# Patient Record
Sex: MALE | Race: WHITE
[De-identification: names, ages, dates, MRNs, and addresses within clinical notes are randomized per-mention and may not be internally consistent; named-entity substitution may affect disease eponyms.]

---

## 2019-02-18 ENCOUNTER — HOSPITAL ENCOUNTER (INPATIENT)
Dept: HOSPITAL 53 - M ED | Age: 39
LOS: 2 days | Discharge: HOME | DRG: 135 | End: 2019-02-20
Attending: SURGERY | Admitting: SURGERY
Payer: SELF-PAY

## 2019-02-18 VITALS — HEIGHT: 66 IN | WEIGHT: 225.75 LBS | BODY MASS INDEX: 36.28 KG/M2
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
## 2019-02-18 DIAGNOSIS — S27.0XXA: Primary | ICD-10-CM

## 2019-02-18 DIAGNOSIS — Z79.899: ICD-10-CM

## 2019-02-18 DIAGNOSIS — F17.290: ICD-10-CM
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
## 2019-02-18 DIAGNOSIS — K21.9: ICD-10-CM

## 2019-02-18 DIAGNOSIS — S22.41XA: ICD-10-CM
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
## 2019-02-18 LAB
ALBUMIN SERPL BCG-MCNC: 4.1 GM/DL (ref 3.2–5.2)
ALT SERPL W P-5'-P-CCNC: 50 U/L (ref 12–78)
BILIRUB CONJ SERPL-MCNC: 0.2 MG/DL (ref 0–0.2)
BILIRUB SERPL-MCNC: 0.9 MG/DL (ref 0.2–1)
BUN SERPL-MCNC: 13 MG/DL (ref 7–18)
CALCIUM SERPL-MCNC: 9.1 MG/DL (ref 8.5–10.1)
CHLORIDE SERPL-SCNC: 105 MEQ/L (ref 98–107)
CO2 SERPL-SCNC: 28 MEQ/L (ref 21–32)
CREAT SERPL-MCNC: 1.06 MG/DL (ref 0.7–1.3)
GFR SERPL CREATININE-BSD FRML MDRD: > 60 ML/MIN/{1.73_M2} (ref 60–?)
GLUCOSE SERPL-MCNC: 108 MG/DL (ref 70–100)
HCT VFR BLD AUTO: 46 % (ref 42–52)
HGB BLD-MCNC: 15.9 G/DL (ref 13.5–17.5)
INR PPP: 0.96
MCH RBC QN AUTO: 28.7 PG (ref 27–33)
MCHC RBC AUTO-ENTMCNC: 34.6 G/DL (ref 32–36.5)
MCV RBC AUTO: 83 FL (ref 80–96)
PLATELET # BLD AUTO: 258 10^3/UL (ref 150–450)
POTASSIUM SERPL-SCNC: 4.1 MEQ/L (ref 3.5–5.1)
PROT SERPL-MCNC: 6.9 GM/DL (ref 6.4–8.2)
PROTHROMBIN TIME: 12.9 SECONDS (ref 12.1–14.4)
RBC # BLD AUTO: 5.54 10^6/UL (ref 4.3–6.1)
SODIUM SERPL-SCNC: 140 MEQ/L (ref 136–145)
WBC # BLD AUTO: 12.2 10^3/UL (ref 4–10)

## 2019-02-18 RX ADMIN — SODIUM CHLORIDE SCH MLS/HR: 9 INJECTION, SOLUTION INTRAVENOUS at 18:19

## 2019-02-18 RX ADMIN — SODIUM CHLORIDE SCH MLS/HR: 9 INJECTION, SOLUTION INTRAVENOUS at 22:17

## 2019-02-18 RX ADMIN — SODIUM CHLORIDE SCH MLS/HR: 9 INJECTION, SOLUTION INTRAVENOUS at 14:22

## 2019-02-18 RX ADMIN — MORPHINE SULFATE PRN MG: 4 INJECTION INTRAVENOUS at 19:50

## 2019-02-18 NOTE — REP
UNILATERAL RIGHT RIBS, PA CHEST, SIX VIEWS:

 

HISTORY:  Fall.

 

The lungs are clear. The heart is normal in size.  The pulmonary vasculature is

normal in appearance.  There are fractures of the right 5th-9th ribs.

 

IMPRESSION:

 

Fractures of the right 5th-9th ribs.

 

 

Electronically Signed by

Geremias Hunt MD 02/18/2019 02:44 P

## 2019-02-18 NOTE — REP
Chest two views

 

HISTORY: Pneumothorax

 

Comparison: 01:49 p.m. 02/18/2019

 

There is elevation of the right hemidiaphragm.  The lungs are clear. A very small

apical pneumothorax is present.  The heart is normal in size.  The pulmonary

vasculature is normal in appearance.  There are fractures of the right fifth and

sixth ribs.  The previously described right seventh through ninth rib fractures

are not seen in the present radiographs.

 

IMPRESSION:

1. Fractures of the right fifth and sixth ribs.

2.  Small right apical pneumothorax.

 

 

Electronically Signed by

Geremias Hunt MD 02/18/2019 07:16 P
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
## 2019-02-19 VITALS — DIASTOLIC BLOOD PRESSURE: 87 MMHG | SYSTOLIC BLOOD PRESSURE: 143 MMHG

## 2019-02-19 VITALS — DIASTOLIC BLOOD PRESSURE: 82 MMHG | SYSTOLIC BLOOD PRESSURE: 145 MMHG

## 2019-02-19 VITALS — DIASTOLIC BLOOD PRESSURE: 89 MMHG | SYSTOLIC BLOOD PRESSURE: 147 MMHG

## 2019-02-19 VITALS — SYSTOLIC BLOOD PRESSURE: 158 MMHG | DIASTOLIC BLOOD PRESSURE: 90 MMHG

## 2019-02-19 VITALS — DIASTOLIC BLOOD PRESSURE: 84 MMHG | SYSTOLIC BLOOD PRESSURE: 147 MMHG

## 2019-02-19 VITALS — SYSTOLIC BLOOD PRESSURE: 144 MMHG | DIASTOLIC BLOOD PRESSURE: 86 MMHG

## 2019-02-19 LAB
BASOPHILS # BLD AUTO: 0 10^3/UL (ref 0–0.2)
BASOPHILS NFR BLD AUTO: 0.3 % (ref 0–1)
BUN SERPL-MCNC: 13 MG/DL (ref 7–18)
CALCIUM SERPL-MCNC: 7.8 MG/DL (ref 8.5–10.1)
CHLORIDE SERPL-SCNC: 111 MEQ/L (ref 98–107)
CO2 SERPL-SCNC: 24 MEQ/L (ref 21–32)
CREAT SERPL-MCNC: 1 MG/DL (ref 0.7–1.3)
EOSINOPHIL # BLD AUTO: 0.1 10^3/UL (ref 0–0.5)
EOSINOPHIL NFR BLD AUTO: 0.8 % (ref 0–3)
GFR SERPL CREATININE-BSD FRML MDRD: > 60 ML/MIN/{1.73_M2} (ref 60–?)
GLUCOSE SERPL-MCNC: 106 MG/DL (ref 70–100)
HCT VFR BLD AUTO: 39 % (ref 42–52)
HCT VFR BLD AUTO: 39.1 % (ref 42–52)
HGB BLD-MCNC: 13 G/DL (ref 13.5–17.5)
HGB BLD-MCNC: 13 G/DL (ref 13.5–17.5)
LYMPHOCYTES # BLD AUTO: 1.9 10^3/UL (ref 1.5–4.5)
LYMPHOCYTES NFR BLD AUTO: 20.6 % (ref 24–44)
MCH RBC QN AUTO: 28.6 PG (ref 27–33)
MCHC RBC AUTO-ENTMCNC: 33.3 G/DL (ref 32–36.5)
MCV RBC AUTO: 85.9 FL (ref 80–96)
MONOCYTES # BLD AUTO: 1 10^3/UL (ref 0–0.8)
MONOCYTES NFR BLD AUTO: 10.6 % (ref 0–5)
NEUTROPHILS # BLD AUTO: 6.2 10^3/UL (ref 1.8–7.7)
NEUTROPHILS NFR BLD AUTO: 67.4 % (ref 36–66)
PLATELET # BLD AUTO: 216 10^3/UL (ref 150–450)
POTASSIUM SERPL-SCNC: 3.8 MEQ/L (ref 3.5–5.1)
RBC # BLD AUTO: 4.54 10^6/UL (ref 4.3–6.1)
SODIUM SERPL-SCNC: 141 MEQ/L (ref 136–145)
WBC # BLD AUTO: 9.3 10^3/UL (ref 4–10)

## 2019-02-19 RX ADMIN — SODIUM CHLORIDE SCH MLS/HR: 9 INJECTION, SOLUTION INTRAVENOUS at 02:15

## 2019-02-19 RX ADMIN — KETOROLAC TROMETHAMINE PRN MG: 30 INJECTION, SOLUTION INTRAMUSCULAR at 09:58

## 2019-02-19 RX ADMIN — ENOXAPARIN SODIUM SCH MG: 40 INJECTION SUBCUTANEOUS at 13:17

## 2019-02-19 RX ADMIN — MORPHINE SULFATE PRN MG: 4 INJECTION INTRAVENOUS at 18:06

## 2019-02-19 RX ADMIN — MORPHINE SULFATE PRN MG: 4 INJECTION INTRAVENOUS at 07:59

## 2019-02-19 RX ADMIN — MORPHINE SULFATE PRN MG: 4 INJECTION INTRAVENOUS at 01:57

## 2019-02-19 RX ADMIN — SODIUM CHLORIDE SCH MLS/HR: 9 INJECTION, SOLUTION INTRAVENOUS at 22:00

## 2019-02-19 RX ADMIN — MORPHINE SULFATE PRN MG: 4 INJECTION INTRAVENOUS at 12:38

## 2019-02-19 RX ADMIN — SODIUM CHLORIDE SCH MLS/HR: 9 INJECTION, SOLUTION INTRAVENOUS at 17:18

## 2019-02-19 RX ADMIN — MORPHINE SULFATE PRN MG: 4 INJECTION INTRAVENOUS at 22:22

## 2019-02-19 RX ADMIN — SODIUM CHLORIDE SCH MLS/HR: 9 INJECTION, SOLUTION INTRAVENOUS at 11:36

## 2019-02-19 RX ADMIN — KETOROLAC TROMETHAMINE PRN MG: 30 INJECTION, SOLUTION INTRAMUSCULAR at 02:51

## 2019-02-19 RX ADMIN — KETOROLAC TROMETHAMINE PRN MG: 30 INJECTION, SOLUTION INTRAMUSCULAR at 23:53

## 2019-02-19 RX ADMIN — PANTOPRAZOLE SODIUM SCH MG: 40 TABLET, DELAYED RELEASE ORAL at 07:58

## 2019-02-19 RX ADMIN — KETOROLAC TROMETHAMINE PRN MG: 30 INJECTION, SOLUTION INTRAMUSCULAR at 15:43

## 2019-02-19 NOTE — IPNPDOC
Subjective


Date Seen


The patient was seen on 2/19/19.





Subjective


Chief Complaint/HPI


Patient is examined at bedside. He stated that he has 2/10 "excruciating" pain 

in anterior and lateral right sided chest wall pain worsened by movement and 

breathing still. Patient described that he is still unable to sit up or roll 

over. He reported that he had 2 bowel movements yesterday and had been 

urinating. Described SOB during movement only. He reported that his chronic 

right shoulder pain is minimal compared to his right sided chest wall pain. It 

was noted that patient's incentive spirometry/PEP was still in plastic wrap this

morning, and patient started using them later in the morning. He denied any 

fever, chills, nausea, vomiting, abdominal pain, wheezing, extremity weakness, 

or calf pain.


General:  Denies: Chills


Constitutional:  Denies: Chills, Fever


ENT:  Denies: Dysphagia


Pulmonary:  Reports: Dyspnea (during movement only), Other Symptoms (No wheezing

reported); 


   Denies: Cough


Cardiovascular:  Denies: Chest Pain, Palpitations, Orthopnea


Gastrointestinal:  Denies: Nausea, Vomiting, Abdominal Pain


Genitourinary:  Denies: Dysuria


Musculoskeletal:  Reports: Shoulder Pain (Minimal right shoulder pain); 


   Denies: Leg Pain


Neurological:  Denies: Weakness, Numbness, Change in speech





Objective


Physical Examination


General Exam:  Positive: Alert, Cooperative, No Acute Distress


Eye Exam:  Positive: Conjunctiva & lids normal; 


   Negative: Sclera icteric, Ptosis


ENT Exam:  Positive: Atraumatic, Mucous membr. moist/pink, Nares Patent, Ext 

Auditory Canal Nml


Neck Exam:  Positive: Supple; 


   Negative: JVD


Chest Exam:  Positive: Clear to auscultation, Normal air movement, Other 

(anterior and lateral right sided chest wall non-tender to palpation. ); 


   Negative: Rales, Rhonchi, Wheezing


Heart Exam:  Positive: Rate Normal, Regular Rhythm, Normal S1, Normal S2; 


   Negative: Murmurs


Abdomen Exam:  Positive: Normal bowel sounds, Soft; 


   Negative: Tenderness, Mass


Extremity Exam:  Positive: Normal pulses; 


   Negative: Edema, Tenderness, Swelling


Skin Exam:  Positive: Nl turgor and temperature; 


   Negative: Rash, Lesion


Neuro Exam:  Positive: Other (Patient is able to move all 4 extremities)


Psych Exam:  Positive: Mental status NL, Mood NL, Memory Intact, Oriented x 3





Assessment /Plan


Problems





(1) Ribs, multiple fractures


Status:  Acute


Discussed With:  Patient


Problem Text:  Multiple rib fractures due to mechanical fall. CT chest showed 

lateral 5th rib fracture and anterior 6th to 9th rib fracture and rib X ray 

showed right 5th-9th rib fracture. CXR showed 5th and 6th rib fracture only. 

right sided chest wall pain worsened by movement or breathing. Continue PEP and 

incentive spirometry to prevent pneumonia. Will continue pain medication. PT and

OT ordered. Activity per PT/OT





(2) Pneumothorax on right


Status:  Resolved


Response to Treatment:  Improving


Discussed With:  Patient


Problem Text:  Small right sided pneumothorax. Patient only has mild SOB during 

movement. Pt hemodynamically stable and saturating well on RA. Continue 

incentive spirometry and oxygen therapy. Repeated CXR this morning showed that 

the small right sided pneumothorax was not visualized anymore. Another repeated 

CXR pending. 





(3) Fall


Status:  Resolved


Response to Treatment:  Stable


Discussed With:  Patient


Problem Text:  Mechanical fall due to accident. Resolved. Patient denied prior 

fall. Denied any loss of consciousness or AMS prior or after the fall. No recent

medication changes. Continue to vital signs as scheduled. Continue to monitor 

the patient. PT and OT ordered. 





(4) Chronic GERD


Status:  Chronic


Response to Treatment:  Stable, Controlled


Problem Text:  Medical history of GERD. Patient takes OTC Prilosec. No heartburn

was reported. Continue Protonix PO. Continue to monitor the patient. 








Plan/VTE


VTE Prophylaxis Ordered?:  Yes (Lovenox, TEDS, SCD)





Plan


IVF:  Decrease


Diet:  Continue Current


Therapy:  PT, OT


Diagnostics:  Repeat Labs in AM, Xrays





VS, I&O, 24H, Fishbone


Vital Signs/I&O





Vital Signs








  Date Time  Temp Pulse Resp B/P (MAP) Pulse Ox O2 Delivery O2 Flow Rate FiO2


 


2/19/19 08:00 97.7 59 18 147/84 (105) 99   


 


2/19/19 01:30      Room Air  














I&O- Last 24 Hours up to 6 AM 


 


 2/19/19





 06:00


 


Intake Total 2000 ml


 


Output Total 600 ml


 


Balance 1400 ml











Laboratory Data


24H LABS


Laboratory Tests 2


2/18/19 14:25: 


Nucleated Red Blood Cells % (auto) 0.0, Prothrombin Time 12.9, Prothromb Time 

International Ratio 0.96, Anion Gap 7L, Glomerular Filtration Rate > 60.0, 

Calcium Level 9.1, Aspartate Amino Transf (AST/SGOT) 32, Alanine 

Aminotransferase (ALT/SGPT) 50, Alkaline Phosphatase 67, Total Bilirubin 0.9, 

Direct Bilirubin 0.2, Total Protein 6.9, Albumin 4.1, Albumin/Globulin Ratio 

1.46


2/19/19 05:08: 


Anion Gap 6L, Glomerular Filtration Rate > 60.0, Calcium Level 7.8L, Blood Urea 

Nitrogen 13, Creatinine 1.00, Sodium Level 141, Potassium Level 3.8, Chloride 

Level 111H, Carbon Dioxide Level 24


2/19/19 05:09: 


Nucleated Red Blood Cells % (auto) 0.0, Immature Granulocyte % (Auto) 0.3, White

Blood Count 9.3, Red Blood Count 4.54, Hemoglobin 13.0#L, Hematocrit 39.0L, Mean

Corpuscular Volume 85.9, Mean Corpuscular Hemoglobin 28.6, Mean Corpuscular 

Hemoglobin Concent 33.3, Red Cell Distribution Width 11.8, Platelet Count 216, 

Neutrophils (%) (Auto) 67.4H, Lymphocytes (%) (Auto) 20.6L, Monocytes (%) (Auto)

10.6H, Eosinophils (%) (Auto) 0.8, Basophils (%) (Auto) 0.3, Neutrophils # 

(Auto) 6.2, Lymphocytes # (Auto) 1.9, Monocytes # (Auto) 1.0H, Eosinophils # 

(Auto) 0.1, Basophils # (Auto) 0.0


CBC/BMP


Laboratory Tests


2/18/19 14:25








Red Blood Count 5.54, Mean Corpuscular Volume 83.0, Mean Corpuscular Hemoglobin 

28.7, Mean Corpuscular Hemoglobin Concent 34.6, Red Cell Distribution Width 11.5





2/19/19 05:08








Calcium Level 7.8 L





2/19/19 05:09








Red Blood Count 4.54, Mean Corpuscular Volume 85.9, Mean Corpuscular Hemoglobin 

28.6, Mean Corpuscular Hemoglobin Concent 33.3, Red Cell Distribution Width 

11.8, Neutrophils (%) (Auto) 67.4 H, Lymphocytes (%) (Auto) 20.6 L, Monocytes 

(%) (Auto) 10.6 H, Eosinophils (%) (Auto) 0.8, Basophils (%) (Auto) 0.3, 

Neutrophils # (Auto) 6.2, Lymphocytes # (Auto) 1.9, Monocytes # (Auto) 1.0 H, 

Eosinophils # (Auto) 0.1, Basophils # (Auto) 0.0











JOHN EVANS DO                 Feb 19, 2019 08:40
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
## 2019-02-19 NOTE — REP
Chest two views

 

HISTORY: Pneumothorax

 

Comparison: 02/18/2019

 

There is elevation of the right hemidiaphragm.  The lungs are clear.  The

previously noted small right apical pneumothorax is not seen.  The heart is

normal in size.  The pulmonary vasculature is normal in appearance.  There are

fractures of the right fifth and sixth ribs.

 

IMPRESSION:

 

1.  The previously  noted small right apical pneumothorax is not seen.

2.  Fractures of the right fifth and sixth ribs.

 

 

Electronically Signed by

Geremias Hunt MD 02/19/2019 10:13 A

## 2019-02-19 NOTE — REP
PA and lateral chest:

 

Comparison is from 09:37 a.m. earlier today.

There is elevation of the right hemidiaphragm, unchanged.

There is atelectasis above the elevated right hemidiaphragm.

The patient has known right rib fractures.

No pneumothorax is identified and study.

Left lung is clear.  Cardiac size is normal.

 

 

Electronically Signed by

Jim Martell MD 02/19/2019 05:01 P

## 2019-02-19 NOTE — HPEPDOC
General Surgery H&P


Date of Admission


Feb 18, 2019


Attending Physician:  CHAPITO URIBE MD





History and Physical


CHIEF COMPLAINT: chest pain





HISTORY OF PRESENT ILLNESS: Patient was walking back to his car and when he 

slipped on ice and fell backwards on his right side and could not get up by 

himself due to severe pain. He denies hitting his head or losing consciousness. 

Patient unable to get up due to the degree of pain and he called 911. He was 

seen by EMS, evaluated and brought to the emergency room. He had a remote 

history of right shoulder dislocation with some mild tingling sensation on his 

fingers. This has been stable even with the injury to that side and patient does

not report any weakness on the arms. In the emergency room he was evaluated and 

was found to have multiple rib fractures as well as a small pneumothorax that 

was seen on CT scan of the chest but not on the chest x-rays.





ALLERGIES: Please see below.





HOME MEDICATIONS: Please see below.





PAST MEDICAL HISTORY:


1. Gastroesophageal reflux disease.


2. History of separation right shoulder joint with some residual numbness on the

right arm.


3. Occasional migraine-type headaches





PAST SURGICAL HISTORY:


1. None.





PERSONAL/SOCIAL HISTORY: Patient vapes. Occasional alcohol use. He denies any 

recreational drug use.





REVIEW OF SYSTEMS: 


GENERAL: Denies chills, fatigue, fever, weight gain and weight loss. Patient in 

his usual state of health prior to the trauma accident


HEENT: Denies blurred vision and double vision.  Denies ear symptoms. Denies 

hoarseness.


NECK: Denies any neck pain.   


CARDIOVASCULAR: Chest wall pain.


MUSCULOSKELETAL: History of right arm/shoulder dislocation. He still has some 

problems rotating abducting his right arm. Minimal residual numbness on the 

fingers.


SKIN: Denies rash.


NEUROLOGIC: Denies headache, stroke and transient ischemic attack.


PSYCHIATRIC: Denies anxiety and depression.


ENDOCRINE: Denies thyroid disease.


HEMATOLOGY/ONCOLOGY: Denies any bleeding or clotting disorder.


HEART: Denies any precordial chest pains, palpitations, paroxysmal dyspnea, 

orthopnea.


PULMONARY: Denies chronic cough, dyspnea and wheezing.


GASTROINTESTINAL: Denies rectal bleeding, family history of colon cancer, 

constipation, diarrhea, dysphagia, heartburn and jaundice.


GENITOURINARY: Denies dysuria, frequency, hematuria and nocturia.


ENDOCRINE: Denies polydipsia, polyphagia, polyuria, heat or cold intolerance.


INFECTIOUS: Denies any recent upper respiratory tract infection, UTI, need for 

use of antibiotics.


NUTRITION: Reports good appetite.





PHYSICAL EXAMINATION:


VITAL SIGNS: Please see below.


GENERAL APPEARANCE: Patient seen laying flat on the bed, not moving a lot due to

the amount of discomfort caused. Movement. He appears relatively comfortable. He

reports his pain is about 3/10. Able to take deep inspirations without much 

noticeable discomfort.. Awake, alert, oriented.   


HEENT: Normocephalic, atraumatic. Pink palpebral conjunctivae. Anicteric 

sclerae. Lips moist.  


CHEST: No chest wall abnormalities. Normal respiratory motion/effort.


NECK: Supple. No thyromegaly. No lymphadenopathies.  No crepitus


LUNGS: No obvious bruising, open wounds on the chest. No subcutaneous 

crepitations lung sounds are clear to auscultation bilaterally. No wheezing no 

rales.  


HEART: Regular heart rate and rhythm.  


ABDOMEN: Abdomen is nondistended, soft, nontender. 


SKIN: Warm, moist.


EXTREMITIES: Extremities have no deformities.  No edema identified. Patient has 

to move his right shoulder/arm a particular way to be able to rotated secondary 

to remote history of shoulder dislocation but otherwise able to move both upper 

extremities without any noticeable weakness or limitations


NEUROLOGICAL: GCS 15. No cranial nerve abnormalities. Arm strength is 5 out of 5

in both upper extremities. Leg strength is 5 out of 5 on both lower extremities.

No numbness or sensory deficits on all 4 extremities


ANCILLARIES: .





LABORATORY DATA: Please see below.





MICROBIOLOGY: Please see below.





IMAGING:


Chest wall/ribs x-ray


Fractures of the right 5th-9th ribs.





CT chest


1.  Right lateral 5th and posterior 6th through 9th rib fractures, nondisplaced


with small effusion and small anterior pneumothorax towards the lower chest.  No


pneumomediastinum.  Trace right effusion on the right.  Left lung clear.


 


2.  Elevation right diaphragm, likely chronic.


 


3.  No compression fractures or destructive lesions in the spine, posterior


elements or scapula.  The AC joint on the right shows degenerative changes and


old post-traumatic change.  No definite acute fracture in the right shoulder.





IMPRESSION AND PLAN:


Multiple rib fractures


Small pneumothorax





Patient has multiple rib fractures and small pneumothorax from a fall after 

slipping on ice. He appears comfortable with couple of doses of morphine IV 

given to him in the emergency room. He he is hemodynamically stable. He is not 

showing any signs of tension pneumothorax. He is breathing comfortably and 

saturating above 95% on room air. He does not have a flail chest. The main 

concern right now is preventing complications from this including pneumonia and 

DVT from inadequate activity as well as shallow breathing from the pain from the

rib fractures. I'll admit him in a monitored setting. I'll repeat the chest x-

ray. The last one have this taken out to in the afternoon approximately about 7 

hours prior to me evaluating him. I'll give him intermittent doses of morphine 

IV and oral doses of Percocet as well as scheduled doses of Toradol IV for pain 

control. He'll be given incentive spirometer and PEP to ensure he is taking 

adequate breaths. He'll be started on DVT prophylaxis.





All their questions and concerns were addressed at the time I saw the patient in

the emergency room.





Vital Signs





Vital Signs








  Date Time  Temp Pulse Resp B/P (MAP) Pulse Ox O2 Delivery O2 Flow Rate FiO2


 


2/19/19 12:00 98.1 56 16 147/89 (108) 99   


 


2/19/19 01:30      Room Air  








I&Os











I&O- Last 24 Hours up to 6 AM 


 


 2/19/19





 06:00


 


Intake Total 2000 ml


 


Output Total 600 ml


 


Balance 1400 ml











Laboratory Data


Labs 24H


Laboratory Tests 2


2/18/19 14:25: 


Nucleated Red Blood Cells % (auto) 0.0, Prothrombin Time 12.9, Prothromb Time 

International Ratio 0.96, Anion Gap 7L, Glomerular Filtration Rate > 60.0, 

Calcium Level 9.1, Aspartate Amino Transf (AST/SGOT) 32, Alanine 

Aminotransferase (ALT/SGPT) 50, Alkaline Phosphatase 67, Total Bilirubin 0.9, 

Direct Bilirubin 0.2, Total Protein 6.9, Albumin 4.1, Albumin/Globulin Ratio 

1.46


2/19/19 05:08: 


Anion Gap 6L, Glomerular Filtration Rate > 60.0, Calcium Level 7.8L, Blood Urea 

Nitrogen 13, Creatinine 1.00, Sodium Level 141, Potassium Level 3.8, Chloride 

Level 111H, Carbon Dioxide Level 24


2/19/19 05:09: 


Nucleated Red Blood Cells % (auto) 0.0, Immature Granulocyte % (Auto) 0.3, White

Blood Count 9.3, Red Blood Count 4.54, Hemoglobin 13.0#L, Hematocrit 39.0L, Mean

Corpuscular Volume 85.9, Mean Corpuscular Hemoglobin 28.6, Mean Corpuscular 

Hemoglobin Concent 33.3, Red Cell Distribution Width 11.8, Platelet Count 216, 

Neutrophils (%) (Auto) 67.4H, Lymphocytes (%) (Auto) 20.6L, Monocytes (%) (Auto)

10.6H, Eosinophils (%) (Auto) 0.8, Basophils (%) (Auto) 0.3, Neutrophils # 

(Auto) 6.2, Lymphocytes # (Auto) 1.9, Monocytes # (Auto) 1.0H, Eosinophils # 

(Auto) 0.1, Basophils # (Auto) 0.0


CBC/BMP


Laboratory Tests


2/18/19 14:25








Red Blood Count 5.54, Mean Corpuscular Volume 83.0, Mean Corpuscular Hemoglobin 

28.7, Mean Corpuscular Hemoglobin Concent 34.6, Red Cell Distribution Width 11.5





2/19/19 05:08








Calcium Level 7.8 L





2/19/19 05:09








Red Blood Count 4.54, Mean Corpuscular Volume 85.9, Mean Corpuscular Hemoglobin 

28.6, Mean Corpuscular Hemoglobin Concent 33.3, Red Cell Distribution Width 

11.8, Neutrophils (%) (Auto) 67.4 H, Lymphocytes (%) (Auto) 20.6 L, Monocytes 

(%) (Auto) 10.6 H, Eosinophils (%) (Auto) 0.8, Basophils (%) (Auto) 0.3, 

Neutrophils # (Auto) 6.2, Lymphocytes # (Auto) 1.9, Monocytes # (Auto) 1.0 H, 

Eosinophils # (Auto) 0.1, Basophils # (Auto) 0.0











Home Medications


Scheduled


Omeprazole Magnesium (Prilosec Otc) 20 Mg Tab, 20 MG PO DAILY, (Reported)





Scheduled PRN


Ibuprofen (Ibuprofen) 600 Mg Tab, 600 MG PO Q6H PRN for PAIN





Allergies


Coded Allergies:  


     No Known Drug Allergy (Verified  Allergy, Unknown, 2/18/19)











CHAPITO URIBE MD         Feb 19, 2019 12:37

## 2019-02-20 VITALS — SYSTOLIC BLOOD PRESSURE: 144 MMHG | DIASTOLIC BLOOD PRESSURE: 70 MMHG

## 2019-02-20 VITALS — SYSTOLIC BLOOD PRESSURE: 164 MMHG | DIASTOLIC BLOOD PRESSURE: 72 MMHG

## 2019-02-20 VITALS — SYSTOLIC BLOOD PRESSURE: 168 MMHG | DIASTOLIC BLOOD PRESSURE: 85 MMHG

## 2019-02-20 VITALS — SYSTOLIC BLOOD PRESSURE: 150 MMHG | DIASTOLIC BLOOD PRESSURE: 80 MMHG

## 2019-02-20 LAB
BUN SERPL-MCNC: 11 MG/DL (ref 7–18)
CALCIUM SERPL-MCNC: 7.7 MG/DL (ref 8.5–10.1)
CHLORIDE SERPL-SCNC: 112 MEQ/L (ref 98–107)
CO2 SERPL-SCNC: 24 MEQ/L (ref 21–32)
CREAT SERPL-MCNC: 0.87 MG/DL (ref 0.7–1.3)
GFR SERPL CREATININE-BSD FRML MDRD: > 60 ML/MIN/{1.73_M2} (ref 60–?)
GLUCOSE SERPL-MCNC: 126 MG/DL (ref 70–100)
HCT VFR BLD AUTO: 36.5 % (ref 42–52)
HGB BLD-MCNC: 12.2 G/DL (ref 13.5–17.5)
MCH RBC QN AUTO: 28.2 PG (ref 27–33)
MCHC RBC AUTO-ENTMCNC: 33.4 G/DL (ref 32–36.5)
MCV RBC AUTO: 84.3 FL (ref 80–96)
PLATELET # BLD AUTO: 162 10^3/UL (ref 150–450)
POTASSIUM SERPL-SCNC: 4.1 MEQ/L (ref 3.5–5.1)
RBC # BLD AUTO: 4.33 10^6/UL (ref 4.3–6.1)
SODIUM SERPL-SCNC: 141 MEQ/L (ref 136–145)
WBC # BLD AUTO: 6.9 10^3/UL (ref 4–10)

## 2019-02-20 RX ADMIN — SODIUM CHLORIDE SCH MLS/HR: 9 INJECTION, SOLUTION INTRAVENOUS at 08:21

## 2019-02-20 RX ADMIN — ENOXAPARIN SODIUM SCH MG: 40 INJECTION SUBCUTANEOUS at 08:21

## 2019-02-20 RX ADMIN — MORPHINE SULFATE PRN MG: 4 INJECTION INTRAVENOUS at 05:21

## 2019-02-20 RX ADMIN — PANTOPRAZOLE SODIUM SCH MG: 40 TABLET, DELAYED RELEASE ORAL at 08:20

## 2019-02-20 NOTE — IPNPDOC
Subjective


Date Seen


The patient was seen on 2/20/19.





Subjective


Chief Complaint/HPI


Patient reported 9/10 right sided chest wall pain. He reported that he has been 

able to be out of bed, to the chair, and stand with the presence of pain. Chest 

wall pain still worsened by movement and inhalation. Denied SOB even during 

inhalation. Described no more than baseline cough. Patient today stated that he 

has had no bowel movement since admission; described no problem with no 

urination. Patient reported that he tried to not request pain meds if 

possible/pain tolerable. Denied any fever, chills, nausea, extremity weakness, 

chest pain, or palpitation. It was noted that patient's BP was elevated 

overnight but repeated BP this morning went down to 144/70


General:  Denies: Chills


Constitutional:  Denies: Chills, Fever, Weakness


Pulmonary:  Reports: Cough (baseline); 


   Denies: Dyspnea


Cardiovascular:  Denies: Chest Pain, Palpitations, Orthopnea


Gastrointestinal:  Reports: Other Symptoms (Denied heartburn); 


   Denies: Nausea, Abdominal Pain, Diarrhea


Genitourinary:  Denies: Dysuria, Retention


Musculoskeletal:  Reports: Other Symptoms (right sided chest wall pain)


Neurological:  Denies: Change in speech, Confusion





Objective


Physical Examination


General Exam:  Positive: Alert, No Acute Distress


Eye Exam:  Positive: Conjunctiva & lids normal; 


   Negative: Sclera icteric, Ptosis


ENT Exam:  Positive: Atraumatic, Mucous membr. moist/pink, Nares Patent, Ext 

Auditory Canal Nml


Neck Exam:  Positive: Supple; 


   Negative: JVD


Chest Exam:  Positive: Normal air movement, Diminished (mild in right lower 

lobe), Other (anterior and lateral right sided chest wall non-tender to 

palpation. ); 


   Negative: Rales, Rhonchi, Wheezing


Heart Exam:  Positive: Rate Normal, Regular Rhythm, Normal S1, Normal S2; 


   Negative: Murmurs


Abdomen Exam:  Positive: Normal bowel sounds, Soft; 


   Negative: Tenderness, Mass


Extremity Exam:  Positive: Normal pulses, Other (No calf tenderness upon 

palpation); 


   Negative: Edema, Swelling


Skin Exam:  Positive: Nl turgor and temperature; 


   Negative: Rash, Lesion


Neuro Exam:  Positive: Other (Patient is able to move all 4 extremities)


Psych Exam:  Positive: Mental status NL, Memory Intact, Oriented x 3





Assessment /Plan


Problems





(1) Ribs, multiple fractures


Status:  Acute


Discussed With:  Patient


Problem Text:  Multiple rib fractures due to mechanical fall. CT chest showed 

lateral 5th rib fracture and anterior 6th to 9th rib fracture and rib X ray 

showed right 5th-9th rib fracture. CXR showed 5th and 6th rib fracture only. 

right sided chest wall pain worsened by movement or breathing. Continue PEP and 

incentive spirometry to prevent pneumonia. Elevated BP likely 2/2 pain as 

patient reported he tried to not request pain meds. IV morphine changed to Q8hP 

severe pain, may titrate down/dc if possible; continue percocet PRN and toradol 

as scheduled. PT and OT recommended home with services. Activity per PT/OT. PFS 

consult ordered.





(2) Pneumothorax on right


Status:  Resolved


Response to Treatment:  Improving


Discussed With:  Patient


Problem Text:  Small right sided pneumothorax. Patient only has mild SOB during 

movement. Pt hemodynamically stable and saturating well on RA. Continue 

incentive spirometry and oxygen therapy. 2 of repeated CXR on 2/19/19 showed 

that the small right sided pneumothorax was not visualized anymore. Mild 

diminished lung sound in right lower lobe. Continue to observe the pt. Vital 

signs as scheduled





(3) Fall


Status:  Resolved


Response to Treatment:  Stable


Discussed With:  Patient


Problem Text:  Mechanical fall due to accident. Resolved. Patient denied prior 

fall. Denied any loss of consciousness or AMS prior or after the fall. No recent

medication changes. Continue to vital signs as scheduled. Continue to monitor 

the patient. PT and OT recommended home with service. 





(4) Chronic GERD


Status:  Chronic


Response to Treatment:  Stable, Controlled


Problem Text:  Medical history of GERD. Patient takes OTC Prilosec. Patient 

denied heartburn symptoms. Continue Protonix PO. Continue to monitor the 

patient. 








Plan/VTE


VTE Prophylaxis Ordered?:  Yes (Lovenox, TEDS, SCD)





Plan


IVF:  Discontinue


Diet:  Continue Current


Therapy:  PT, OT


Pt and Family Services:  Home Care, Insurance / Financial


Diagnostics:  Repeat Labs in AM


Anticipated Discharge:  Home With Services





VS, I&O, 24H, Fishbone


Vital Signs/I&O





Vital Signs








  Date Time  Temp Pulse Resp B/P (MAP) Pulse Ox O2 Delivery O2 Flow Rate FiO2


 


2/20/19 08:20   18     


 


2/20/19 08:03 97.8 72  144/70 (94) 100   


 


2/20/19 05:31       96.0 


 


2/19/19 01:30      Room Air  














I&O- Last 24 Hours up to 6 AM 


 


 2/20/19





 05:59


 


Intake Total 5750 ml


 


Output Total 700 ml


 


Balance 5050 ml











Laboratory Data


24H LABS


Laboratory Tests 2


2/20/19 07:25: 


Nucleated Red Blood Cells % (auto) 0.0, Anion Gap 5L, Glomerular Filtration Rate

> 60.0, Blood Urea Nitrogen 11, Creatinine 0.87, Sodium Level 141, Potassium 

Level 4.1, Chloride Level 112H, Carbon Dioxide Level 24, Calcium Level 7.7L


CBC/BMP


Laboratory Tests


2/19/19 11:56








2/20/19 07:25








Red Blood Count 4.33, Mean Corpuscular Volume 84.3, Mean Corpuscular Hemoglobin 

28.2, Mean Corpuscular Hemoglobin Concent 33.4, Red Cell Distribution Width 

11.9, Calcium Level 7.7 L











JOHN EVANS DO                 Feb 20, 2019 09:25

## 2019-02-20 NOTE — DS.PDOC
Discharge Summary


General


Date of Admission


Feb 18, 2019 at 18:40


Date of Discharge


2/20/19


Attending Physician:  CHAPITO COOPER MD





Discharge Summary


PROCEDURES PERFORMED DURING STAY: None.





ADMITTING DIAGNOSES: 


1. Right sided 5th to 9th rib fractures secondary to mechanical fall


2. Small right sided pneumothorax


3. GERD








DISCHARGE DIAGNOSES:


1. Rib, multiple fractures. Likely only right 5th and 6th rib only. Questionable

right 5th to 9th rib fracture


2. Small right sided pneumothorax


3. Fall


4. Chronic GERD





COMPLICATIONS/CHIEF COMPLAINT: Ribs, Multiple Fractures.





HISTORY OF PRESENT ILLNESS: Patient is a 40 yo male with PMH of right sided 

 shoulder and GERD presented to [facility] ER due to hitting right sided chest

wall after mechanical fall d/t slipping on ice. Denied hitting his head or loss 

of consciousness, stating it was purely an accident with no extremity weakness. 

Patient presented with right sided chest wall pain and reported that he was 

unable to move his torso or stand after the fall due to pain. Reported right 

sided chest wall pain 8/10 in anterior and lateral aspect aggravated by movement

and taking deep breath. Alleviated by lying still and pain meds. Reported no 

loss os sensation or extremity movement control. Denied loss of bowel/urinary 

control.





HOSPITAL COURSE: Patient received IVF hydration and IV morphine.  Patient was 

saturating well on room well and appeared to be hemodynamically stable. Rib X 

ray showed right 5th to 9th rib fracture, and CT chest w/ contrast showed small 

right pneumothorax with non-displaced right lateral 5th rib fracture and 

posterior 6th through 9th rib non-displaced anterior fracture. Surgical team was

called for admission. Repeated CXR was ordered to monitor pneumothorax, and the 

pneumothorax subsequently resolved. Pain meds including IV morphine, Percocet, 

and Toradol were ordered. Patient were instructed to use PEP and incentive 

spirometry. He reported that he has not wanted to request too much pain meds if 

possible during hospital stay. He reported the mild SOB during breathing 

resolved. Still has right sided chest wall pain during movement and breathing, 

but he was able to be out of bed and stand. Patient was cleared by PT/OT to be 

d/c home with service. It was explained to patient that rib fractures will take 

a few weeks to be completely healed. 


 


DISCHARGE MEDICATIONS: Please see below.


 


ALLERGIES: Please see below.





PHYSICAL EXAMINATION ON DISCHARGE:


VITAL SIGNS: Please see below.


General Exam:  Positive: Alert, No Acute Distress


Eye Exam:  Positive: Conjunctiva & lids normal; 


   Negative: Sclera icteric, Ptosis


ENT Exam:  Positive: Atraumatic, Mucous membr. moist/pink, Nares Patent, Ext 

Auditory Canal Nml


Neck Exam:  Positive: Supple; 


   Negative: JVD


Chest Exam:  Positive: Normal air movement, Diminished (mild in right lower 

lobe), Other (anterior and lateral right sided chest wall non-tender to 

palpation. ); 


   Negative: Rales, Rhonchi, Wheezing


Heart Exam:  Positive: Rate Normal, Regular Rhythm, Normal S1, Normal S2; 


   Negative: Murmurs


Abdomen Exam:  Positive: Normal bowel sounds, Soft; 


   Negative: Tenderness, Mass


Extremity Exam:  Positive: Normal pulses, Other (No calf tenderness upon 

palpation); 


   Negative: Edema, Swelling


Skin Exam:  Positive: Nl turgor and temperature; 


   Negative: Rash, Lesion


Neuro Exam:  Positive: Other (Patient is able to move all 4 extremities)


Psych Exam:  Positive: Mental status NL, Memory Intact, Oriented x 3





LABORATORY DATA: Please see below.





IMAGING: CT chest with contrast showed right sided lateral 5th rib fracture and 

anterior 6th to 9th rib fracture with small right sided pneumothorax. Rib X ray 

showed right sided 5th to 9th rib fracture. Repeated chest x rays showed 

resolution of the small pneumothorax, and only right 5th and 6th rib fracture 

was noted. Atelectasis also noted. 





PROGNOSIS: Good





ACTIVITY: As tolerated.





DIET: As tolerated





DISCHARGE PLAN AND INSTRUCTIONS:


Light activity. No strenuous exercise. Patient will follow up with Dr. Cooper 

in 2 weeks and follow up with primary care physician in 7 days. He is contact 

provider if shortness of breath or severe pain.Patient will use PEP and incentiv

e spirometry.  Ambulate as tolerated; pt advised not to stay in bed during the 

day. Ibuprofen for pain control after discussion with patient and per patient 

preference. A paper prescription of walker was given to the nursing staff for 

patient.





ITEMS TO FOLLOWUP ON ON OUTPATIENT:


1. Right sided rib fractures


2. Right sided small pneumothorax





DISCHARGE CONDITION: Stable.





TIME SPENT ON DISCHARGE: Greater than 10 minutes.





Vital Signs/I&Os





Vital Signs








  Date Time  Temp Pulse Resp B/P (MAP) Pulse Ox O2 Delivery O2 Flow Rate FiO2


 


2/20/19 12:06 98.6 63 20 164/72 (102) 100   


 


2/20/19 05:31       96.0 


 


2/19/19 01:30      Room Air  














I&O- Last 24 Hours up to 6 AM 


 


 2/20/19





 06:00


 


Intake Total 5750 ml


 


Output Total 700 ml


 


Balance 5050 ml











Laboratory Data


Labs 24H


Laboratory Tests 2


2/20/19 07:25: 


Nucleated Red Blood Cells % (auto) 0.0, Anion Gap 5L, Glomerular Filtration Rate

> 60.0, Blood Urea Nitrogen 11, Creatinine 0.87, Sodium Level 141, Potassium 

Level 4.1, Chloride Level 112H, Carbon Dioxide Level 24, Calcium Level 7.7L


CBC/BMP


Laboratory Tests


2/20/19 07:25








Red Blood Count 4.33, Mean Corpuscular Volume 84.3, Mean Corpuscular Hemoglobin 

28.2, Mean Corpuscular Hemoglobin Concent 33.4, Red Cell Distribution Width 

11.9, Calcium Level 7.7 L





Discharge Medications


Scheduled


Omeprazole Magnesium (Prilosec Otc) 20 Mg Tab, 20 MG PO DAILY, (Reported)





Scheduled PRN


Ibuprofen (Ibuprofen) 600 Mg Tab, 600 MG PO Q6H PRN for PAIN





Allergies


Coded Allergies:  


     No Known Drug Allergy (Verified  Allergy, Unknown, 2/18/19)











JOHN EVANS DO                 Feb 20, 2019 13:10


CHAPITO COOPER MD         Feb 21, 2019 09:00

## 2019-03-04 ENCOUNTER — HOSPITAL ENCOUNTER (OUTPATIENT)
Dept: HOSPITAL 53 - M LAB | Age: 39
End: 2019-03-04
Attending: NURSE PRACTITIONER
Payer: SELF-PAY

## 2019-03-04 DIAGNOSIS — E55.9: ICD-10-CM

## 2019-03-04 DIAGNOSIS — Z00.00: Primary | ICD-10-CM

## 2019-03-04 LAB
25(OH)D3 SERPL-MCNC: 11.9 NG/ML (ref 30–100)
ALBUMIN SERPL BCG-MCNC: 3.7 GM/DL (ref 3.2–5.2)
ALT SERPL W P-5'-P-CCNC: 39 U/L (ref 12–78)
BILIRUB SERPL-MCNC: 0.8 MG/DL (ref 0.2–1)
BUN SERPL-MCNC: 14 MG/DL (ref 7–18)
CALCIUM SERPL-MCNC: 8.6 MG/DL (ref 8.5–10.1)
CHLORIDE SERPL-SCNC: 105 MEQ/L (ref 98–107)
CHOLEST SERPL-MCNC: 166 MG/DL (ref ?–200)
CHOLEST/HDLC SERPL: 3.25 {RATIO} (ref ?–5)
CO2 SERPL-SCNC: 28 MEQ/L (ref 21–32)
CREAT SERPL-MCNC: 1 MG/DL (ref 0.7–1.3)
GFR SERPL CREATININE-BSD FRML MDRD: > 60 ML/MIN/{1.73_M2} (ref 60–?)
GLUCOSE SERPL-MCNC: 94 MG/DL (ref 70–100)
HDLC SERPL-MCNC: 51 MG/DL (ref 40–?)
LDLC SERPL CALC-MCNC: 92 MG/DL (ref ?–100)
NONHDLC SERPL-MCNC: 115 MG/DL
POTASSIUM SERPL-SCNC: 4.9 MEQ/L (ref 3.5–5.1)
PROT SERPL-MCNC: 6.9 GM/DL (ref 6.4–8.2)
SODIUM SERPL-SCNC: 139 MEQ/L (ref 136–145)
TRIGL SERPL-MCNC: 113 MG/DL (ref ?–150)